# Patient Record
Sex: FEMALE | Race: WHITE | URBAN - METROPOLITAN AREA
[De-identification: names, ages, dates, MRNs, and addresses within clinical notes are randomized per-mention and may not be internally consistent; named-entity substitution may affect disease eponyms.]

---

## 2020-01-10 ENCOUNTER — Encounter (OUTPATIENT)
Dept: URBAN - METROPOLITAN AREA CLINIC 71 | Facility: CLINIC | Age: 65
End: 2020-01-10
Payer: MEDICARE

## 2020-01-10 PROCEDURE — 92134 CPTRZ OPH DX IMG PST SGM RTA: CPT | Performed by: OPTOMETRIST

## 2020-01-10 PROCEDURE — 92014 COMPRE OPH EXAM EST PT 1/>: CPT | Performed by: OPTOMETRIST

## 2020-08-11 ENCOUNTER — OFFICE VISIT (OUTPATIENT)
Dept: URBAN - METROPOLITAN AREA CLINIC 71 | Facility: CLINIC | Age: 65
End: 2020-08-11
Payer: MEDICARE

## 2020-08-11 DIAGNOSIS — H25.13 AGE-RELATED NUCLEAR CATARACT, BILATERAL: ICD-10-CM

## 2020-08-11 DIAGNOSIS — H35.3131 NONEXUDATIVE AGE-RELATED MACULAR DEGENERATION, BILATERAL, EARLY DRY STAGE: Primary | ICD-10-CM

## 2020-08-11 PROCEDURE — 92014 COMPRE OPH EXAM EST PT 1/>: CPT | Performed by: OPTOMETRIST

## 2020-08-11 PROCEDURE — 92134 CPTRZ OPH DX IMG PST SGM RTA: CPT | Performed by: OPTOMETRIST

## 2020-08-11 ASSESSMENT — INTRAOCULAR PRESSURE
OD: 18
OS: 17

## 2020-08-11 NOTE — IMPRESSION/PLAN
Impression: Age-related nuclear cataract, bilateral: H25.13.  Plan: NOT SURGICAL AT THIS TIME, ED PT ON DIAGNOSIS

## 2020-08-11 NOTE — IMPRESSION/PLAN
Impression: Nonexudative age-related macular degeneration, bilateral, early dry stage: H35.3131.  Plan: ED pt on diagnosis, recommend eye vitamins or a healthy diet
advised pt to return to clinic if she notices any changes in her vision

## 2021-02-10 ENCOUNTER — OFFICE VISIT (OUTPATIENT)
Dept: URBAN - METROPOLITAN AREA CLINIC 75 | Facility: CLINIC | Age: 66
End: 2021-02-10
Payer: MEDICARE

## 2021-02-10 DIAGNOSIS — H35.033 HYPERTENSIVE RETINOPATHY, BILATERAL: ICD-10-CM

## 2021-02-10 PROCEDURE — 92133 CPTRZD OPH DX IMG PST SGM ON: CPT | Performed by: OPTOMETRIST

## 2021-02-10 PROCEDURE — 92014 COMPRE OPH EXAM EST PT 1/>: CPT | Performed by: OPTOMETRIST

## 2021-02-10 PROCEDURE — 92134 CPTRZ OPH DX IMG PST SGM RTA: CPT | Performed by: OPTOMETRIST

## 2021-02-10 RX ORDER — LATANOPROST 50 UG/ML
0.005 % SOLUTION OPHTHALMIC
Qty: 7.5 | Refills: 3 | Status: INACTIVE
Start: 2021-02-10 | End: 2021-12-02

## 2021-02-10 ASSESSMENT — INTRAOCULAR PRESSURE
OS: 19
OD: 23

## 2021-02-10 NOTE — IMPRESSION/PLAN
Impression: Age-related nuclear cataract, bilateral: H25.13-Cataracts do not require treatment unless they interfere with vision and impact one's activities of daily living, in which case cataract extraction with lens implant insertion should be performed. Plan: Observe.

## 2021-02-10 NOTE — IMPRESSION/PLAN
Impression: Nonexudative age-related macular degeneration, bilateral, early dry stage: H35.3131- OCT preformed today reveals mild-moderate sized drusen OU. Plan: Discussed today's findings w.pt. Recommend pt continue taking eye vitamins and maintain a diet that includes green leafy vegetables.

## 2021-02-10 NOTE — IMPRESSION/PLAN
Impression: Primary open-angle glaucoma, bilateral, mild stage: H40.1131- IOP elevated. OCT preformed today reveal possible thinning. Large C/D ratio. **TARGET IOP BELOW 17** Plan: Discussed diagnosis w/pt in detail. Will continue to monitor condition. 
Will begin treatment, pt to begin taking Latanoprost QD OU.

** Pt to return in 1 month for IOP r/c

## 2021-02-10 NOTE — IMPRESSION/PLAN
Impression: Hypertensive retinopathy, bilateral: H35.033- Emphasized blood pressure, cholesterol  and sugar control. Plan: Observe.

## 2021-03-19 ENCOUNTER — OFFICE VISIT (OUTPATIENT)
Dept: URBAN - METROPOLITAN AREA CLINIC 75 | Facility: CLINIC | Age: 66
End: 2021-03-19
Payer: MEDICARE

## 2021-03-19 DIAGNOSIS — H47.233 GLAUCOMATOUS OPTIC ATROPHY, BILATERAL: ICD-10-CM

## 2021-03-19 DIAGNOSIS — H52.223 REGULAR ASTIGMATISM, BILATERAL: ICD-10-CM

## 2021-03-19 PROCEDURE — 92273 FULL FIELD ERG W/I&R: CPT | Performed by: OPTOMETRIST

## 2021-03-19 PROCEDURE — 92014 COMPRE OPH EXAM EST PT 1/>: CPT | Performed by: OPTOMETRIST

## 2021-03-19 ASSESSMENT — INTRAOCULAR PRESSURE
OD: 14
OS: 14

## 2021-03-19 ASSESSMENT — VISUAL ACUITY
OS: 20/30
OD: 20/30

## 2021-03-19 NOTE — IMPRESSION/PLAN
Impression: Primary open-angle glaucoma, bilateral, mild stage: H40.1131. Plan: Glaucoma is usually a disease of high pressure in the eye that damages the optic nerve and causes loss of peripheral vision and possibly even blindness. Glaucoma treatment with various combinations of eye drops will usually help to lower the eye pressure and prevent further damage. In advanced or poorly controlled cases, laser treatment of conventional glaucoma surgery may be necessary. Contact office if drops are causing pain, irritation, or blurry vision after use. 
Continue Latanoprost OU QHS

## 2021-09-01 ENCOUNTER — OFFICE VISIT (OUTPATIENT)
Dept: URBAN - METROPOLITAN AREA CLINIC 75 | Facility: CLINIC | Age: 66
End: 2021-09-01
Payer: MEDICARE

## 2021-09-01 DIAGNOSIS — H25.813 COMBINED FORMS OF AGE-RELATED CATARACT, BILATERAL: ICD-10-CM

## 2021-09-01 DIAGNOSIS — H52.4 PRESBYOPIA: ICD-10-CM

## 2021-09-01 PROCEDURE — 92015 DETERMINE REFRACTIVE STATE: CPT | Performed by: OPTOMETRIST

## 2021-09-01 PROCEDURE — 92083 EXTENDED VISUAL FIELD XM: CPT | Performed by: OPTOMETRIST

## 2021-09-01 PROCEDURE — 92014 COMPRE OPH EXAM EST PT 1/>: CPT | Performed by: OPTOMETRIST

## 2021-09-01 ASSESSMENT — VISUAL ACUITY
OD: 20/25
OS: 20/25

## 2021-09-01 ASSESSMENT — INTRAOCULAR PRESSURE
OD: 14
OS: 16

## 2021-09-01 NOTE — IMPRESSION/PLAN
Impression: Primary open-angle glaucoma, bilateral, mild stage: H40.1131- Visual field and OPTOS performed today. IOP is stable on current drop therapy. Plan: Educated patient on findings and test results. 
Continue Latanoprost OU QHS

## 2021-09-01 NOTE — IMPRESSION/PLAN
Impression: Nonexudative age-related macular degeneration, bilateral, early dry stage: H35.3131- Optos reveals scattered drusen throughout macula. Plan: Discussed today's findings w/pt. Recommend pt continue taking eye vitamins and maintain a diet that includes green leafy vegetables.

## 2021-09-01 NOTE — IMPRESSION/PLAN
Impression: Presbyopia: H52.4- Refraction recheck performed. Plan: Updated glasses prescription dispensed.

## 2022-03-01 ENCOUNTER — OFFICE VISIT (OUTPATIENT)
Dept: URBAN - METROPOLITAN AREA CLINIC 75 | Facility: CLINIC | Age: 67
End: 2022-03-01
Payer: MEDICARE

## 2022-03-01 DIAGNOSIS — H40.1131 PRIMARY OPEN-ANGLE GLAUCOMA, BILATERAL, MILD STAGE: Primary | ICD-10-CM

## 2022-03-01 DIAGNOSIS — H04.123 DRY EYE SYNDROME OF BILATERAL LACRIMAL GLANDS: ICD-10-CM

## 2022-03-01 PROCEDURE — 92133 CPTRZD OPH DX IMG PST SGM ON: CPT | Performed by: OPTOMETRIST

## 2022-03-01 PROCEDURE — 92134 CPTRZ OPH DX IMG PST SGM RTA: CPT | Performed by: OPTOMETRIST

## 2022-03-01 PROCEDURE — 99214 OFFICE O/P EST MOD 30 MIN: CPT | Performed by: OPTOMETRIST

## 2022-03-01 ASSESSMENT — INTRAOCULAR PRESSURE
OS: 16
OD: 15

## 2022-03-01 NOTE — IMPRESSION/PLAN
Impression: Dry eye syndrome of bilateral lacrimal glands: H04.123. Plan: Dry eye syndrome requires lubrication of the eye with artificial tears and nighttime ointments or gels. In addition, topical and oral anti-inflammatory medications are usually necessary to treat the associated ocular irritation. Contact office if dry eye does not improve, worsens or blurs vision. Recommend 3-4 drops daily of Systane Balance and ointment at bedtime. Educated pt on (warm not hot) compress.

## 2022-03-01 NOTE — IMPRESSION/PLAN
Impression: Nonexudative age-related macular degeneration, bilateral, early dry stage: H35.3131. OCT ordered and performed trace change's OD > OS Plan: The clinical exam and OCT are consistent with dry age-related macular degeneration. The diagnosis, natural history, and prognosis of dry AMD were discussed. The patient was instructed to begin taking AREDS protocol antioxidant vitamins. The use of an Amsler grid and the importance of weekly self-monitoring were reviewed. The patient was instructed to call our office immediately upon any decreased vision or metamorphopsia.

## 2022-03-01 NOTE — IMPRESSION/PLAN
Impression: Primary open-angle glaucoma, bilateral, mild stage: H40.1131. OCT ordered and performed Plan: Glaucoma is usually a disease of high pressure in the eye that damages the optic nerve and causes loss of peripheral vision and possibly even blindness. Glaucoma treatment with various combinations of eye drops will usually help to lower the eye pressure and prevent further damage. In advanced or poorly controlled cases, laser treatment of conventional glaucoma surgery may be necessary. Continue Latanoprost QHS OU. Contact office if drops are causing pain, irritation, or blurry vision after use.

## 2022-10-05 ENCOUNTER — OFFICE VISIT (OUTPATIENT)
Dept: URBAN - METROPOLITAN AREA CLINIC 75 | Facility: CLINIC | Age: 67
End: 2022-10-05
Payer: MEDICARE

## 2022-10-05 DIAGNOSIS — H25.813 COMBINED FORMS OF AGE-RELATED CATARACT, BILATERAL: ICD-10-CM

## 2022-10-05 DIAGNOSIS — H40.1131 PRIMARY OPEN-ANGLE GLAUCOMA, BILATERAL, MILD STAGE: Primary | ICD-10-CM

## 2022-10-05 DIAGNOSIS — H47.233 GLAUCOMATOUS OPTIC ATROPHY, BILATERAL: ICD-10-CM

## 2022-10-05 DIAGNOSIS — H35.3132 NONEXUDATIVE MACULAR DEGENERATION, INTERMEDIATE DRY STAGE, BILATERAL: ICD-10-CM

## 2022-10-05 PROCEDURE — 92273 FULL FIELD ERG W/I&R: CPT | Performed by: OPTOMETRIST

## 2022-10-05 PROCEDURE — 99214 OFFICE O/P EST MOD 30 MIN: CPT | Performed by: OPTOMETRIST

## 2022-10-05 PROCEDURE — 92083 EXTENDED VISUAL FIELD XM: CPT | Performed by: OPTOMETRIST

## 2022-10-05 ASSESSMENT — INTRAOCULAR PRESSURE
OS: 14
OD: 12

## 2022-10-05 NOTE — IMPRESSION/PLAN
Impression: Primary open-angle glaucoma, bilateral, mild stage: H40.1131. VF & Optos ordered and performed Plan: Glaucoma is usually a disease of high pressure in the eye that damages the optic nerve and causes loss of peripheral vision and possibly even blindness. Glaucoma treatment with various combinations of eye drops will usually help to lower the eye pressure and prevent further damage. In advanced or poorly controlled cases, laser treatment of conventional glaucoma surgery may be necessary. Continue Latanoprost QHS OU. Contact office if drops are causing pain, irritation, or blurry vision after use.

## 2022-10-05 NOTE — IMPRESSION/PLAN
Impression: Nonexudative macular degeneration, intermediate dry stage, bilateral: H35.3132. VF defects appear more AMD than Glaucoma OPTOS ordered and performed No tears or holes found at this time. Plan: The clinical exam are consistent with dry age-related macular degeneration. The diagnosis, natural history, and prognosis of dry AMD were discussed. The patient was instructed to begin taking Eye vitamins such as Vista, AREDS, OCUVITE, or Ocu Cell in capsule form over tablet to allow for better absorption. A diet rich in green vegetables is recommended to promote eye health. Zeaxanthin & Lutein vitamins found in a green rich diet of Kale, Spinach or OTC Vitamins. The patient was also advised to continue to monitor the Amsler grid, avoid smoking, and call us immediately with any visual changes.

## 2022-10-05 NOTE — IMPRESSION/PLAN
Impression: Glaucomatous optic atrophy, bilateral: H47.233. ERG ordered and performed Good Base Line - OS W Ratio trace slow Plan: See Plan #1.

## 2023-08-04 ENCOUNTER — OFFICE VISIT (OUTPATIENT)
Dept: URBAN - METROPOLITAN AREA CLINIC 75 | Facility: CLINIC | Age: 68
End: 2023-08-04
Payer: MEDICARE

## 2023-08-04 DIAGNOSIS — H35.3131 NONEXUDATIVE AGE-RELATED MACULAR DEGENERATION, BILATERAL, EARLY DRY STAGE: ICD-10-CM

## 2023-08-04 DIAGNOSIS — H04.129 DRY EYE SYNDROME OF UNSPECIFIED LACRIMAL GLAND: ICD-10-CM

## 2023-08-04 DIAGNOSIS — H47.233 GLAUCOMATOUS OPTIC ATROPHY, BILATERAL: ICD-10-CM

## 2023-08-04 DIAGNOSIS — H40.1131 PRIMARY OPEN-ANGLE GLAUCOMA, BILATERAL, MILD STAGE: Primary | ICD-10-CM

## 2023-08-04 DIAGNOSIS — H52.4 PRESBYOPIA: ICD-10-CM

## 2023-08-04 PROCEDURE — 99213 OFFICE O/P EST LOW 20 MIN: CPT | Performed by: OPTOMETRIST

## 2023-08-04 PROCEDURE — 92083 EXTENDED VISUAL FIELD XM: CPT | Performed by: OPTOMETRIST

## 2023-08-04 PROCEDURE — 92134 CPTRZ OPH DX IMG PST SGM RTA: CPT | Performed by: OPTOMETRIST

## 2023-08-04 PROCEDURE — 92273 FULL FIELD ERG W/I&R: CPT | Performed by: OPTOMETRIST

## 2023-08-04 RX ORDER — TIMOLOL MALEATE 5 MG/ML
0.5 % SOLUTION/ DROPS OPHTHALMIC
Qty: 10 | Refills: 3 | Status: ACTIVE
Start: 2023-08-04

## 2023-08-04 ASSESSMENT — INTRAOCULAR PRESSURE
OD: 18
OS: 15

## 2023-08-04 ASSESSMENT — VISUAL ACUITY
OS: 20/20
OD: 20/40

## 2023-09-06 ENCOUNTER — OFFICE VISIT (OUTPATIENT)
Dept: URBAN - METROPOLITAN AREA CLINIC 75 | Facility: CLINIC | Age: 68
End: 2023-09-06
Payer: MEDICARE

## 2023-09-06 DIAGNOSIS — H40.1131 PRIMARY OPEN-ANGLE GLAUCOMA, BILATERAL, MILD STAGE: Primary | ICD-10-CM

## 2023-09-06 PROCEDURE — 99213 OFFICE O/P EST LOW 20 MIN: CPT | Performed by: OPTOMETRIST

## 2023-09-06 ASSESSMENT — INTRAOCULAR PRESSURE
OS: 8
OD: 10

## 2024-04-03 ENCOUNTER — OFFICE VISIT (OUTPATIENT)
Dept: URBAN - METROPOLITAN AREA CLINIC 75 | Facility: CLINIC | Age: 69
End: 2024-04-03
Payer: MEDICARE

## 2024-04-03 DIAGNOSIS — H40.1131 PRIMARY OPEN-ANGLE GLAUCOMA, BILATERAL, MILD STAGE: Primary | ICD-10-CM

## 2024-04-03 DIAGNOSIS — H52.4 PRESBYOPIA: ICD-10-CM

## 2024-04-03 DIAGNOSIS — H47.233 GLAUCOMATOUS OPTIC ATROPHY, BILATERAL: ICD-10-CM

## 2024-04-03 DIAGNOSIS — H35.3131 NONEXUDATIVE AGE-RELATED MACULAR DEGENERATION, BILATERAL, EARLY DRY STAGE: ICD-10-CM

## 2024-04-03 PROCEDURE — 99214 OFFICE O/P EST MOD 30 MIN: CPT | Performed by: OPTOMETRIST

## 2024-04-03 PROCEDURE — 92133 CPTRZD OPH DX IMG PST SGM ON: CPT | Performed by: OPTOMETRIST

## 2024-04-03 ASSESSMENT — INTRAOCULAR PRESSURE
OS: 11
OD: 13

## 2024-04-03 ASSESSMENT — VISUAL ACUITY
OD: 20/25
OS: 20/20

## 2024-10-03 ENCOUNTER — OFFICE VISIT (OUTPATIENT)
Dept: URBAN - METROPOLITAN AREA CLINIC 75 | Facility: CLINIC | Age: 69
End: 2024-10-03
Payer: MEDICARE

## 2024-10-03 DIAGNOSIS — H40.1131 PRIMARY OPEN-ANGLE GLAUCOMA, BILATERAL, MILD STAGE: Primary | ICD-10-CM

## 2024-10-03 DIAGNOSIS — H35.3131 NONEXUDATIVE AGE-RELATED MACULAR DEGENERATION, BILATERAL, EARLY DRY STAGE: ICD-10-CM

## 2024-10-03 DIAGNOSIS — H47.233 GLAUCOMATOUS OPTIC ATROPHY, BILATERAL: ICD-10-CM

## 2024-10-03 PROCEDURE — 92083 EXTENDED VISUAL FIELD XM: CPT | Performed by: OPTOMETRIST

## 2024-10-03 PROCEDURE — 99214 OFFICE O/P EST MOD 30 MIN: CPT | Performed by: OPTOMETRIST

## 2024-10-03 PROCEDURE — 92273 FULL FIELD ERG W/I&R: CPT | Performed by: OPTOMETRIST

## 2024-10-03 ASSESSMENT — INTRAOCULAR PRESSURE
OD: 10
OS: 10

## 2024-12-02 ENCOUNTER — OFFICE VISIT (OUTPATIENT)
Dept: URBAN - METROPOLITAN AREA CLINIC 75 | Facility: CLINIC | Age: 69
End: 2024-12-02
Payer: MEDICARE

## 2024-12-02 DIAGNOSIS — H40.1131 PRIMARY OPEN-ANGLE GLAUCOMA, BILATERAL, MILD STAGE: Primary | ICD-10-CM

## 2024-12-02 PROCEDURE — 99213 OFFICE O/P EST LOW 20 MIN: CPT | Performed by: OPTOMETRIST

## 2024-12-02 ASSESSMENT — INTRAOCULAR PRESSURE
OD: 11
OS: 13

## 2025-04-04 ENCOUNTER — OFFICE VISIT (OUTPATIENT)
Dept: URBAN - METROPOLITAN AREA CLINIC 75 | Facility: CLINIC | Age: 70
End: 2025-04-04
Payer: MEDICARE

## 2025-04-04 DIAGNOSIS — H35.3131 NONEXUDATIVE AGE-RELATED MACULAR DEGENERATION, BILATERAL, EARLY DRY STAGE: ICD-10-CM

## 2025-04-04 DIAGNOSIS — H25.13 AGE-RELATED NUCLEAR CATARACT, BILATERAL: ICD-10-CM

## 2025-04-04 DIAGNOSIS — H40.1131 PRIMARY OPEN-ANGLE GLAUCOMA, BILATERAL, MILD STAGE: Primary | ICD-10-CM

## 2025-04-04 PROCEDURE — 92134 CPTRZ OPH DX IMG PST SGM RTA: CPT | Performed by: OPTOMETRIST

## 2025-04-04 PROCEDURE — 99213 OFFICE O/P EST LOW 20 MIN: CPT | Performed by: OPTOMETRIST

## 2025-04-04 PROCEDURE — 92133 CPTRZD OPH DX IMG PST SGM ON: CPT | Performed by: OPTOMETRIST

## 2025-04-04 RX ORDER — DORZOLAMIDE HCL 20 MG/ML
2 % SOLUTION/ DROPS OPHTHALMIC
Qty: 15 | Refills: 3 | Status: ACTIVE
Start: 2025-04-04

## 2025-04-04 ASSESSMENT — INTRAOCULAR PRESSURE
OS: 15
OD: 15

## 2025-05-06 ENCOUNTER — OFFICE VISIT (OUTPATIENT)
Dept: URBAN - METROPOLITAN AREA CLINIC 75 | Facility: CLINIC | Age: 70
End: 2025-05-06
Payer: COMMERCIAL

## 2025-05-06 DIAGNOSIS — T88.7XXA DRUG REACTION, INITIAL ENCOUNTER: ICD-10-CM

## 2025-05-06 DIAGNOSIS — H52.4 PRESBYOPIA: ICD-10-CM

## 2025-05-06 DIAGNOSIS — H40.1131 PRIMARY OPEN-ANGLE GLAUCOMA, BILATERAL, MILD STAGE: Primary | ICD-10-CM

## 2025-05-06 PROCEDURE — 99213 OFFICE O/P EST LOW 20 MIN: CPT | Performed by: OPTOMETRIST

## 2025-05-06 RX ORDER — DORZOLAMIDE HCL/PF 2 %
2 % DROPS OPHTHALMIC (EYE)
Qty: 10 | Refills: 3 | Status: ACTIVE
Start: 2025-05-06

## 2025-05-06 ASSESSMENT — VISUAL ACUITY
OS: 20/40
OD: 20/40

## 2025-05-06 ASSESSMENT — INTRAOCULAR PRESSURE
OD: 11
OS: 11